# Patient Record
Sex: MALE | Race: WHITE | NOT HISPANIC OR LATINO
[De-identification: names, ages, dates, MRNs, and addresses within clinical notes are randomized per-mention and may not be internally consistent; named-entity substitution may affect disease eponyms.]

---

## 2024-09-13 PROBLEM — Z00.00 ENCOUNTER FOR PREVENTIVE HEALTH EXAMINATION: Status: ACTIVE | Noted: 2024-09-13

## 2024-09-18 ENCOUNTER — APPOINTMENT (OUTPATIENT)
Dept: HEART AND VASCULAR | Facility: CLINIC | Age: 79
End: 2024-09-18
Payer: MEDICARE

## 2024-09-18 VITALS
BODY MASS INDEX: 25.05 KG/M2 | WEIGHT: 189 LBS | OXYGEN SATURATION: 97 % | TEMPERATURE: 97.3 F | HEIGHT: 73 IN | HEART RATE: 60 BPM | RESPIRATION RATE: 16 BRPM | DIASTOLIC BLOOD PRESSURE: 72 MMHG | SYSTOLIC BLOOD PRESSURE: 124 MMHG

## 2024-09-18 DIAGNOSIS — Z85.820 PERSONAL HISTORY OF MALIGNANT MELANOMA OF SKIN: ICD-10-CM

## 2024-09-18 DIAGNOSIS — Z85.46 PERSONAL HISTORY OF MALIGNANT NEOPLASM OF PROSTATE: ICD-10-CM

## 2024-09-18 DIAGNOSIS — I42.8 OTHER CARDIOMYOPATHIES: ICD-10-CM

## 2024-09-18 DIAGNOSIS — Z86.39 PERSONAL HISTORY OF OTHER ENDOCRINE, NUTRITIONAL AND METABOLIC DISEASE: ICD-10-CM

## 2024-09-18 DIAGNOSIS — I35.1 NONRHEUMATIC AORTIC (VALVE) INSUFFICIENCY: ICD-10-CM

## 2024-09-18 DIAGNOSIS — Z86.79 PERSONAL HISTORY OF OTHER DISEASES OF THE CIRCULATORY SYSTEM: ICD-10-CM

## 2024-09-18 PROCEDURE — 93284 PRGRMG EVAL IMPLANTABLE DFB: CPT

## 2024-09-18 PROCEDURE — 99204 OFFICE O/P NEW MOD 45 MIN: CPT | Mod: 25

## 2024-09-18 RX ORDER — POTASSIUM CHLORIDE 1500 MG/1
20 TABLET, EXTENDED RELEASE ORAL
Refills: 0 | Status: ACTIVE | COMMUNITY

## 2024-09-18 RX ORDER — AMLODIPINE BESYLATE 5 MG/1
5 TABLET ORAL
Refills: 0 | Status: ACTIVE | COMMUNITY

## 2024-09-18 RX ORDER — MAGNESIUM OXIDE/MAG AA CHELATE 300 MG
CAPSULE ORAL
Refills: 0 | Status: ACTIVE | COMMUNITY

## 2024-09-18 RX ORDER — TAMSULOSIN HYDROCHLORIDE 0.4 MG/1
0.4 CAPSULE ORAL
Refills: 0 | Status: ACTIVE | COMMUNITY

## 2024-09-18 RX ORDER — FUROSEMIDE 20 MG/1
20 TABLET ORAL
Refills: 0 | Status: ACTIVE | COMMUNITY

## 2024-09-18 RX ORDER — ATORVASTATIN CALCIUM 40 MG/1
40 TABLET, FILM COATED ORAL
Refills: 0 | Status: ACTIVE | COMMUNITY

## 2024-09-18 RX ORDER — WARFARIN 3 MG/1
3 TABLET ORAL
Refills: 0 | Status: ACTIVE | COMMUNITY

## 2024-09-18 RX ORDER — CARVEDILOL PHOSPHATE 20 MG/1
20 CAPSULE, EXTENDED RELEASE ORAL
Refills: 0 | Status: ACTIVE | COMMUNITY

## 2024-09-18 NOTE — HISTORY OF PRESENT ILLNESS
[de-identified] : Mr. Ruiz is an 77yo M who is referred for management of his CRT-D device.  He is followed by Dr. Gutierrez.   He has a PMHx of HTN, HLD, Severe AI s/p mechanical AVR (on coumadin), prostate CA s/p radiation, pAfib (although no recent AF see on his device), non ischemic CM s/p CRT-D (s/p generator change twice).   He is known to have a chronically elevated LV threshold.  Today he feels well and has no device related complaints.  He does not currently have a home monitor and was previously checked routinely with the company rep.   [facility] mechanical AVR Mohs surgery  TTE 6/2023: EF 30%, septal, anteroseptal, anterior wall, apical wall and lateral wall are hypokinetic. mechanical valve in the aortic position with normal function, mild to moderate MR/TR.   [employer] CRT-D underlying sinus rhythm 17mo until HYACINTH.  LV lead shows a chronically elevated threshold- best vector is LVtip to Can (set to 1msec) The remaining pacing, sensing and impedance thresholds are within normal limits. Brief NSVT LV tested w/ various vectors.  No device adjustments were made today

## 2024-09-18 NOTE — DISCUSSION/SUMMARY
[FreeTextEntry1] : Impression 1. non ischemic CM s/p [employer] CRT-D.  2. Hx of pAF.  no recent AF on his device. 3. Mechanical AVR (on coumadin).  4. Chronically elevated threshold on LV 2.25V@1msec  Plan: Mr. Ruiz presents for initial device check and management.  He is known to have a chronically elevated LV threshold.  LV threshold was checked in various vectors today.  No adjustments were made.  Otherwise, normal device function. Some brief NSVT is noted.  No AF events.  17mo until HYACINTH.  We discussed remote monitoring of his device however he prefers to have device checked in the office.  Continue routine follow up with Dr. Gutierrez.   Follow up for device check in 6mo.

## 2024-09-18 NOTE — HISTORY OF PRESENT ILLNESS
[de-identified] : Mr. Ruiz is an 79yo M who is referred for management of his CRT-D device.  He is followed by Dr. Gutierrez.   He has a PMHx of HTN, HLD, Severe AI s/p mechanical AVR (on coumadin), prostate CA s/p radiation, pAfib (although no recent AF see on his device), non ischemic CM s/p CRT-D (s/p generator change twice).   He is known to have a chronically elevated LV threshold.  Today he feels well and has no device related complaints.  He does not currently have a home monitor and was previously checked routinely with the company rep.   [facility] mechanical AVR Mohs surgery  TTE 6/2023: EF 30%, septal, anteroseptal, anterior wall, apical wall and lateral wall are hypokinetic. mechanical valve in the aortic position with normal function, mild to moderate MR/TR.   [employer] CRT-D underlying sinus rhythm 17mo until HYACINTH.  LV lead shows a chronically elevated threshold- best vector is LVtip to Can (set to 1msec) The remaining pacing, sensing and impedance thresholds are within normal limits. Brief NSVT LV tested w/ various vectors.  No device adjustments were made today

## 2024-09-18 NOTE — ADDENDUM
[FreeTextEntry1] :  I, Piper Clemens, am scribing for and the presence of Dr. Larson the following sections: HPI, PMH,Family/social history, ROS, Physical Exam, Assessment / Plan.I, Uriel Larson, personally performed the services described in the documentation, reviewed the documentation recorded by the scribe in my presence and it accurately and completely records my words and actions.

## 2024-09-18 NOTE — REVIEW OF SYSTEMS
[Negative] : Heme/Lymph [Fever] : no fever [Chills] : no chills [Feeling Fatigued] : not feeling fatigued [Blurry Vision] : no blurred vision [SOB] : no shortness of breath [Dyspnea on exertion] : not dyspnea during exertion [Chest Discomfort] : no chest discomfort [Lower Ext Edema] : no extremity edema [Palpitations] : no palpitations [Orthopnea] : no orthopnea [PND] : no PND [Syncope] : no syncope

## 2024-09-18 NOTE — PHYSICAL EXAM
[General Appearance - Well Developed] : well developed [Normal Appearance] : normal appearance [General Appearance - In No Acute Distress] : no acute distress [Heart Rate And Rhythm] : heart rate and rhythm were normal [Heart Sounds] : normal S1 and S2 [] : no respiratory distress [Exaggerated Use Of Accessory Muscles For Inspiration] : no accessory muscle use [Left Infraclavicular] : left infraclavicular area [Clean] : clean [Dry] : dry [Well-Healed] : well-healed [Erythema] : not erythematous [Warm] : not warm [Tender] : not tender

## 2025-04-02 ENCOUNTER — APPOINTMENT (OUTPATIENT)
Dept: HEART AND VASCULAR | Facility: CLINIC | Age: 80
End: 2025-04-02
Payer: MEDICARE

## 2025-04-02 ENCOUNTER — NON-APPOINTMENT (OUTPATIENT)
Age: 80
End: 2025-04-02

## 2025-04-02 VITALS
RESPIRATION RATE: 16 BRPM | OXYGEN SATURATION: 98 % | TEMPERATURE: 97.3 F | BODY MASS INDEX: 25.05 KG/M2 | HEART RATE: 61 BPM | DIASTOLIC BLOOD PRESSURE: 76 MMHG | HEIGHT: 73 IN | SYSTOLIC BLOOD PRESSURE: 130 MMHG | WEIGHT: 189 LBS

## 2025-04-02 DIAGNOSIS — Z95.810 PRESENCE OF AUTOMATIC (IMPLANTABLE) CARDIAC DEFIBRILLATOR: ICD-10-CM

## 2025-04-02 DIAGNOSIS — R42 DIZZINESS AND GIDDINESS: ICD-10-CM

## 2025-04-02 DIAGNOSIS — I44.2 ATRIOVENTRICULAR BLOCK, COMPLETE: ICD-10-CM

## 2025-04-02 DIAGNOSIS — Z95.2 PRESENCE OF PROSTHETIC HEART VALVE: ICD-10-CM

## 2025-04-02 DIAGNOSIS — I48.91 UNSPECIFIED ATRIAL FIBRILLATION: ICD-10-CM

## 2025-04-02 DIAGNOSIS — I42.8 OTHER CARDIOMYOPATHIES: ICD-10-CM

## 2025-04-02 PROCEDURE — 93284 PRGRMG EVAL IMPLANTABLE DFB: CPT

## 2025-04-02 PROCEDURE — 99215 OFFICE O/P EST HI 40 MIN: CPT

## 2025-04-02 PROCEDURE — G2211 COMPLEX E/M VISIT ADD ON: CPT
